# Patient Record
Sex: FEMALE | Race: OTHER | Employment: FULL TIME | URBAN - METROPOLITAN AREA
[De-identification: names, ages, dates, MRNs, and addresses within clinical notes are randomized per-mention and may not be internally consistent; named-entity substitution may affect disease eponyms.]

---

## 2022-12-09 PROBLEM — M35.3 POLYMYALGIA (HCC): Status: ACTIVE | Noted: 2021-03-01

## 2023-10-03 ENCOUNTER — OFFICE VISIT (OUTPATIENT)
Dept: SURGERY | Age: 43
End: 2023-10-03
Payer: COMMERCIAL

## 2023-10-03 VITALS
DIASTOLIC BLOOD PRESSURE: 74 MMHG | BODY MASS INDEX: 33.49 KG/M2 | SYSTOLIC BLOOD PRESSURE: 119 MMHG | HEART RATE: 69 BPM | HEIGHT: 63 IN | WEIGHT: 189 LBS

## 2023-10-03 DIAGNOSIS — Z98.84 S/P LAPAROSCOPIC SLEEVE GASTRECTOMY: ICD-10-CM

## 2023-10-03 DIAGNOSIS — E66.01 MORBID OBESITY (HCC): Primary | ICD-10-CM

## 2023-10-03 DIAGNOSIS — Z71.82 EXERCISE COUNSELING: ICD-10-CM

## 2023-10-03 DIAGNOSIS — E66.9 OBESITY, CLASS I, BMI 30-34.9: ICD-10-CM

## 2023-10-03 DIAGNOSIS — Z71.3 DIETARY COUNSELING: ICD-10-CM

## 2023-10-03 DIAGNOSIS — K21.9 GASTROESOPHAGEAL REFLUX DISEASE WITHOUT ESOPHAGITIS: ICD-10-CM

## 2023-10-03 PROCEDURE — APPSS30 APP SPLIT SHARED TIME 16-30 MINUTES: Performed by: PHYSICIAN ASSISTANT

## 2023-10-03 PROCEDURE — 99213 OFFICE O/P EST LOW 20 MIN: CPT | Performed by: SURGERY

## 2023-10-03 RX ORDER — OMEPRAZOLE 40 MG/1
40 CAPSULE, DELAYED RELEASE ORAL
Qty: 30 CAPSULE | Refills: 5 | Status: SHIPPED | OUTPATIENT
Start: 2023-10-03

## 2024-04-10 NOTE — PROGRESS NOTES
Bariatric Surgery Follow Up Visit    Patient: April Wall MRN: [id number]     YOB: 1980  Age: 43 y.o.  Sex: female              PCP: Jennfier Obrien DO   Date:  4/11/2024    Weight History Graph    Surgeon: Yoan   DOS: 04/05/2023   Procedure: Sleeve   Pre-op weight: 242   Ideal body weight: 150   Excess body weight: 92       Post-Surgical Weight Loss  Date: 04/11/24  Height: 160 cm (5' 3\")  Weight: 83 kg (183 lb)  BMI: 32.41  Weight Change: -6 lbs  Total Weight Change: -59 lbs  % EBWL: 64%     1 year post-op visit after a sleeve gastrectomy was done on 04/05/2023.   She has lost 6lbs since her last office visit.    Evaluation of Pre Operative Co Morbid Conditions:    None    Clinical Assessment and Physical Exam:    Doing well 1 year post op. She reports that she has been somewhat compliant with diet and is not exercising. She denies abdominal pain, nausea, or vomiting. She does occasionally experience GERD symptoms with spicy foods which is well managed with Omeprazole.     Protein:  60 gms/day  Fluids:     70 ounces/day  Exercise:  none  Denies abdominal pain, vomiting, or nausea.  No fever or chills.   Incisions healing well.  Occasional reflux. Denies dysphagia.  Regular bowel movements.   Tolerating Protein first diet without difficulty.    Physical Exam:  /69   Pulse 77   Ht 1.6 m (5' 3\")   Wt 83 kg (183 lb)   BMI 32.42 kg/m²     General: Alert, oriented, cooperative female in no acute distress.   Eyes: Sclera are clear. Conjunctiva and lids within normal limits. No icterus.  Ears and Nose: no gross deformities to visual inspection, gross hearing intact  Neck: Supple, trachea midline, no appreciable thyromegaly  Resp:  Breathing is  non-labored. Lungs clear to auscultation without wheezing or rhonchi   CV: RRR. No murmurs, rubs or gallops appreciated.  Abd: soft, not distended, active BS'S. No tenderness to palpation. Wounds are clean, dry and intact without

## 2024-04-11 ENCOUNTER — OFFICE VISIT (OUTPATIENT)
Dept: SURGERY | Age: 44
End: 2024-04-11
Payer: COMMERCIAL

## 2024-04-11 VITALS
DIASTOLIC BLOOD PRESSURE: 69 MMHG | HEART RATE: 77 BPM | HEIGHT: 63 IN | BODY MASS INDEX: 32.43 KG/M2 | WEIGHT: 183 LBS | SYSTOLIC BLOOD PRESSURE: 115 MMHG

## 2024-04-11 DIAGNOSIS — Z71.3 DIETARY COUNSELING: ICD-10-CM

## 2024-04-11 DIAGNOSIS — E66.9 OBESITY, CLASS I, BMI 30-34.9: ICD-10-CM

## 2024-04-11 DIAGNOSIS — Z71.82 EXERCISE COUNSELING: ICD-10-CM

## 2024-04-11 DIAGNOSIS — Z98.84 S/P LAPAROSCOPIC SLEEVE GASTRECTOMY: ICD-10-CM

## 2024-04-11 DIAGNOSIS — E66.01 MORBID OBESITY (HCC): Primary | ICD-10-CM

## 2024-04-11 PROBLEM — Z90.3 H/O GASTRIC SLEEVE: Chronic | Status: ACTIVE | Noted: 2023-10-25

## 2024-04-11 PROBLEM — E78.00 ELEVATED CHOLESTEROL: Chronic | Status: ACTIVE | Noted: 2020-12-09

## 2024-04-11 PROBLEM — E55.9 VITAMIN D DEFICIENCY: Chronic | Status: ACTIVE | Noted: 2023-10-25

## 2024-04-11 PROCEDURE — 99213 OFFICE O/P EST LOW 20 MIN: CPT | Performed by: SURGERY

## 2024-04-11 PROCEDURE — APPSS30 APP SPLIT SHARED TIME 16-30 MINUTES: Performed by: PHYSICIAN ASSISTANT

## 2024-04-11 NOTE — PROGRESS NOTES
[facility] NUTRITION REASSESSMENT  Assessment:   1 year post-op VSG. Pt consuming ~60g protein/d and ~70oz fluid/d. Pt is eating at least 3x/d. Pt is drinking water. Pt is not currently exercising. Pt is taking MVI and Ca supplements as recommended.    Diet Recall:   Breakfast: Omelette with feta cheese   Lunch: Grilled chicken   Dinner: Shrimp     Intervention:   Evaluated diet recall .  Encouraged continued and increased activity.    Encouraged pt to begin regular exercise routine      Monitoring and Evaluation:  Monitor for continued safe, supervised weight loss for VSG.   F/U per MD Samantha Kelly MBA, Nutritionist, RD eligible